# Patient Record
Sex: FEMALE | Race: WHITE
[De-identification: names, ages, dates, MRNs, and addresses within clinical notes are randomized per-mention and may not be internally consistent; named-entity substitution may affect disease eponyms.]

---

## 2018-07-25 ENCOUNTER — HOSPITAL ENCOUNTER (OUTPATIENT)
Dept: HOSPITAL 53 - M LAB REF | Age: 43
End: 2018-07-25
Payer: COMMERCIAL

## 2018-07-25 DIAGNOSIS — D47.3: ICD-10-CM

## 2018-07-25 DIAGNOSIS — D50.9: Primary | ICD-10-CM

## 2018-07-25 LAB
IMMUNOGLOBULIN A: 187 MG/DL (ref 70–400)
TOTAL PROTEIN,RANDOM URINE: 23.4 MG/DL (ref 0–12)
TOTAL PROTEIN: 6.8 GM/DL (ref 6.4–8.2)
URINE TOTAL PROTEIN: 23.4 MG/DL (ref 0–12)

## 2018-07-25 PROCEDURE — 84165 PROTEIN E-PHORESIS SERUM: CPT

## 2018-07-26 LAB
ALBUMIN %: 56.7 % (ref 55.8–66.1)
ALBUMIN: 3.86 GM/DL (ref 3.29–5.55)
ALPHA-1-GLOBULIN %: 5.5 % (ref 2.9–4.9)
ALPHA-1-GLOBULINS: 0.37 GM/DL (ref 0.17–0.41)
ALPHA-2-GLOBULINS %: 13.6 % (ref 7.1–11.8)
ALPHA-2-GLOBULINS: 0.92 GM/DL (ref 0.42–0.99)
B-GLOBULIN SERPL ELPH-MCNC: 0.47 GM/DL (ref 0.28–0.6)
BETA1 GLOB MFR SERPL ELPH: 6.9 % (ref 4.7–7.2)
BETA2 GLOB MFR SERPL ELPH: 5.6 % (ref 3.2–6.5)
BETA2 GLOB SERPL ELPH-MCNC: 0.38 GM/DL (ref 0.19–0.55)
GAMMA GLOBULIN %: 11.7 % (ref 11.1–18.8)
GAMMA GLOBULINS: 0.8 GM/DL (ref 0.65–1.58)
IT SERUM INTERPRETATION: (no result)
IT URINE INTERPRETATION: (no result)
SPECIMEN VOL 24H UR: (no result) ML
SPEP INTERPRETATION: (no result)
UPEP INTERPRETATION: (no result)

## 2018-07-28 LAB
KAPPA LC FREE SER-MCNC: 15 MG/L (ref 3.3–19.4)
KAPPA LC/LAMBDA SER: 1.56 {RATIO} (ref 0.26–1.65)
LAMBDA LC FREE SERPL-MCNC: 9.6 MG/L (ref 5.7–26.3)

## 2018-10-26 ENCOUNTER — HOSPITAL ENCOUNTER (OUTPATIENT)
Dept: HOSPITAL 53 - M OPP | Age: 43
Discharge: HOME | End: 2018-10-26
Attending: INTERNAL MEDICINE
Payer: COMMERCIAL

## 2018-10-26 DIAGNOSIS — Z91.048: ICD-10-CM

## 2018-10-26 DIAGNOSIS — F32.9: ICD-10-CM

## 2018-10-26 DIAGNOSIS — K31.7: ICD-10-CM

## 2018-10-26 DIAGNOSIS — Z79.899: ICD-10-CM

## 2018-10-26 DIAGNOSIS — K29.70: ICD-10-CM

## 2018-10-26 DIAGNOSIS — G43.909: ICD-10-CM

## 2018-10-26 DIAGNOSIS — M79.7: ICD-10-CM

## 2018-10-26 DIAGNOSIS — K21.9: ICD-10-CM

## 2018-10-26 DIAGNOSIS — Z97.8: ICD-10-CM

## 2018-10-26 DIAGNOSIS — D12.2: ICD-10-CM

## 2018-10-26 DIAGNOSIS — M47.9: ICD-10-CM

## 2018-10-26 DIAGNOSIS — K64.8: Primary | ICD-10-CM

## 2018-10-26 PROCEDURE — 45385 COLONOSCOPY W/LESION REMOVAL: CPT

## 2018-10-26 RX ADMIN — SODIUM CHLORIDE 1 MLS/HR: 9 INJECTION, SOLUTION INTRAVENOUS at 13:02

## 2020-02-21 ENCOUNTER — HOSPITAL ENCOUNTER (OUTPATIENT)
Dept: HOSPITAL 53 - M OPP | Age: 45
Discharge: HOME | End: 2020-02-21
Attending: INTERNAL MEDICINE
Payer: COMMERCIAL

## 2020-02-21 VITALS — WEIGHT: 201 LBS | BODY MASS INDEX: 34.31 KG/M2 | HEIGHT: 64 IN

## 2020-02-21 VITALS — DIASTOLIC BLOOD PRESSURE: 72 MMHG | SYSTOLIC BLOOD PRESSURE: 121 MMHG

## 2020-02-21 DIAGNOSIS — M79.7: ICD-10-CM

## 2020-02-21 DIAGNOSIS — Z86.010: Primary | ICD-10-CM

## 2020-02-21 DIAGNOSIS — Z91.048: ICD-10-CM

## 2020-02-21 DIAGNOSIS — D12.2: ICD-10-CM

## 2020-02-21 DIAGNOSIS — K57.30: ICD-10-CM

## 2020-02-21 DIAGNOSIS — Z79.899: ICD-10-CM

## 2020-02-21 DIAGNOSIS — K64.8: ICD-10-CM

## 2020-02-21 NOTE — ROOR
________________________________________________________________________________

Patient Name: Barbara Medina        Procedure Date: 2/21/2020 7:31 AM

MRN: [id number]                          Account Number: [id number]

YOB: 1975              Age: 44

Room: [facility]                            Gender: Female

Note Status: Finalized                 

________________________________________________________________________________

 

Procedure:           Colonoscopy

Indications:         High risk colon cancer surveillance: Personal history of 

                     sessile serrated colon polyp (10 mm or greater in size)

Providers:           Lefty Gamble MD

Referring MD:        MANDEEP TREJO MD

Requesting Provider: 

Medicines:           Monitored Anesthesia Care

Complications:       No immediate complications.

________________________________________________________________________________

Procedure:           Pre-Anesthesia Assessment:

                     - Prior to the procedure, a History and Physical was 

                     performed, and patient medications and allergies were 

                     reviewed. The patient is competent. The risks and 

                     benefits of the procedure and the sedation options and 

                     risks were discussed with the patient. All questions were 

                     answered and informed consent was obtained. Patient 

                     identification and proposed procedure were verified by 

                     the physician, the nurse and the anesthesiologist in the 

                     procedure room. Mental Status Examination: alert and 

                     oriented. Airway Examination: normal oropharyngeal airway 

                     and neck mobility. Respiratory Examination: clear to 

                     auscultation. CV Examination: normal. Prophylactic 

                     Antibiotics: The patient does not require prophylactic 

                     antibiotics. Prior Anticoagulants: The patient has taken 

                     no previous anticoagulant or antiplatelet agents. ASA 

                     Grade Assessment: II - A patient with mild systemic 

                     disease. After reviewing the risks and benefits, the 

                     patient was deemed in satisfactory condition to undergo 

                     the procedure. The anesthesia plan was to use monitored 

                     anesthesia care (MAC). Immediately prior to 

                     administration of medications, the patient was 

                     re-assessed for adequacy to receive sedatives. The heart 

                     rate, respiratory rate, oxygen saturations, blood 

                     pressure, adequacy of pulmonary ventilation, and response 

                     to care were monitored throughout the procedure. The 

                     physical status of the patient was re-assessed after the 

                     procedure.

                     The Colonoscope was introduced through the anus and 

                     advanced to the terminal ileum, with identification of 

                     the appendiceal orifice and IC valve. The colonoscopy was 

                     performed without difficulty. The patient tolerated the 

                     procedure well. The quality of the bowel preparation was 

                     good. The terminal ileum, ileocecal valve, appendiceal 

                     orifice, and rectum were photographed. Scope insertion 

                     time was 3 minutes. Scope withdrawal time was 9 minutes. 

                     The total duration of the procedure was 15 minutes.

                                                                                

Findings:

     The perianal and digital rectal examinations were normal.

     The terminal ileum appeared normal.

     A 12 mm polyp was found in the ascending colon. The polyp was sessile. 

     The polyp was removed with a hot snare. Resection and retrieval were 

     complete. Verification of patient identification for the specimen was 

     done by the physician and nurse using the patient's name, birth date and 

     medical record number. Estimated blood loss was minimal.

     Multiple small and large-mouthed diverticula were found from sigmoid to 

     descending colon. There was no evidence of diverticular bleeding.

     Non-bleeding external and internal hemorrhoids were found during 

     retroflexion. The hemorrhoids were medium-sized.

                                                                                

Impression:          - The examined portion of the ileum was normal.

                     - One 12 mm polyp in the ascending colon, removed with a 

                     hot snare. Resected and retrieved.

                     - Moderate diverticulosis from sigmoid to descending 

                     colon. There was no evidence of diverticular bleeding.

                     - Non-bleeding external and internal hemorrhoids.

Recommendation:      - Patient has a contact number available for emergencies. 

                     The signs and symptoms of potential delayed complications 

                     were discussed with the patient. Return to normal 

                     activities tomorrow. Written discharge instructions were 

                     provided to the patient.

                     - Clear liquid diet for 1 day, then advance as tolerated 

                     to high fiber diet.

                     - Continue present medications.

                     - Miralax 1 capful (17 grams) in 8 ounces of water PO PRN.

                     - Await pathology results.

                     - Repeat colonoscopy in 3 - 5 years for surveillance 

                     based on pathology results.

                     - Telephone GI clinic for pathology results in 2 weeks.

                     - Return to primary care physician.

                                                                                

 

Lefty Gamble MD

_______________________

Lefty Gamble MD

2/21/2020 8:06:50 AM

Electronically signed by Lefty Gamble MD

Number of Addenda: 0

 

Note Initiated On: 2/21/2020 7:31 AM

Estimated Blood Loss:

     Estimated blood loss was minimal.

## 2022-10-21 ENCOUNTER — HOSPITAL ENCOUNTER (OUTPATIENT)
Dept: HOSPITAL 53 - M LAB REF | Age: 47
End: 2022-10-21
Attending: PHYSICIAN ASSISTANT
Payer: COMMERCIAL

## 2022-10-21 DIAGNOSIS — R19.4: Primary | ICD-10-CM

## 2022-10-30 ENCOUNTER — HOSPITAL ENCOUNTER (OUTPATIENT)
Dept: HOSPITAL 53 - M LABSMTC | Age: 47
End: 2022-10-30
Attending: ANESTHESIOLOGY
Payer: COMMERCIAL

## 2022-10-30 DIAGNOSIS — Z20.822: ICD-10-CM

## 2022-10-30 DIAGNOSIS — Z01.812: Primary | ICD-10-CM

## 2022-11-01 ENCOUNTER — HOSPITAL ENCOUNTER (OUTPATIENT)
Dept: HOSPITAL 53 - M OPP | Age: 47
Discharge: HOME | End: 2022-11-01
Attending: INTERNAL MEDICINE
Payer: COMMERCIAL

## 2022-11-01 VITALS — BODY MASS INDEX: 32.4 KG/M2 | HEIGHT: 64 IN | WEIGHT: 189.8 LBS

## 2022-11-01 VITALS — SYSTOLIC BLOOD PRESSURE: 115 MMHG | DIASTOLIC BLOOD PRESSURE: 63 MMHG

## 2022-11-01 DIAGNOSIS — Z79.3: ICD-10-CM

## 2022-11-01 DIAGNOSIS — Z12.11: Primary | ICD-10-CM

## 2022-11-01 DIAGNOSIS — F32.9: ICD-10-CM

## 2022-11-01 DIAGNOSIS — Z86.010: ICD-10-CM

## 2022-11-01 DIAGNOSIS — K64.8: ICD-10-CM

## 2022-11-01 DIAGNOSIS — K44.9: ICD-10-CM

## 2022-11-01 DIAGNOSIS — K57.30: ICD-10-CM

## 2022-11-01 DIAGNOSIS — D64.9: ICD-10-CM

## 2022-11-01 DIAGNOSIS — Z91.048: ICD-10-CM

## 2022-11-01 DIAGNOSIS — K64.4: ICD-10-CM

## 2022-11-01 DIAGNOSIS — K21.00: ICD-10-CM

## 2022-11-01 DIAGNOSIS — M79.7: ICD-10-CM

## 2022-11-01 DIAGNOSIS — Z80.7: ICD-10-CM

## 2022-11-01 DIAGNOSIS — R10.13: ICD-10-CM

## 2022-11-01 DIAGNOSIS — G43.909: ICD-10-CM

## 2022-11-01 DIAGNOSIS — K29.70: ICD-10-CM

## 2022-11-01 DIAGNOSIS — M19.90: ICD-10-CM

## 2022-11-01 DIAGNOSIS — Z79.899: ICD-10-CM
